# Patient Record
Sex: MALE | Race: WHITE | NOT HISPANIC OR LATINO | URBAN - METROPOLITAN AREA
[De-identification: names, ages, dates, MRNs, and addresses within clinical notes are randomized per-mention and may not be internally consistent; named-entity substitution may affect disease eponyms.]

---

## 2018-01-04 ENCOUNTER — APPOINTMENT (OUTPATIENT)
Dept: URBAN - METROPOLITAN AREA CLINIC 219 | Age: 58
Setting detail: DERMATOLOGY
End: 2018-01-04

## 2018-01-04 DIAGNOSIS — Q82.5 CONGENITAL NON-NEOPLASTIC NEVUS: ICD-10-CM

## 2018-01-04 DIAGNOSIS — L57.0 ACTINIC KERATOSIS: ICD-10-CM

## 2018-01-04 DIAGNOSIS — L663 OTHER SPECIFIED DISEASES OF HAIR AND HAIR FOLLICLES: ICD-10-CM

## 2018-01-04 DIAGNOSIS — L29.8 OTHER PRURITUS: ICD-10-CM

## 2018-01-04 DIAGNOSIS — L738 OTHER SPECIFIED DISEASES OF HAIR AND HAIR FOLLICLES: ICD-10-CM

## 2018-01-04 PROBLEM — L02.92 FURUNCLE, UNSPECIFIED: Status: ACTIVE | Noted: 2018-01-04

## 2018-01-04 PROCEDURE — OTHER PRESCRIPTION: OTHER

## 2018-01-04 PROCEDURE — 99203 OFFICE O/P NEW LOW 30 MIN: CPT | Mod: 25

## 2018-01-04 PROCEDURE — OTHER REASSURANCE: OTHER

## 2018-01-04 PROCEDURE — OTHER LIQUID NITROGEN: OTHER

## 2018-01-04 PROCEDURE — OTHER MIPS QUALITY: OTHER

## 2018-01-04 PROCEDURE — 17000 DESTRUCT PREMALG LESION: CPT

## 2018-01-04 PROCEDURE — OTHER TREATMENT REGIMEN: OTHER

## 2018-01-04 PROCEDURE — OTHER COUNSELING: OTHER

## 2018-01-04 RX ORDER — CLINDAMYCIN PHOSPHATE 10 MG/ML
APPLY SOLUTION TOPICAL
Qty: 1 | Refills: 5 | Status: ERX | COMMUNITY
Start: 2018-01-04

## 2018-01-04 RX ORDER — KETOCONAZOLE 20 MG/ML
APPLY SHAMPOO TOPICAL
Qty: 1 | Refills: 3 | Status: ERX | COMMUNITY
Start: 2018-01-04

## 2018-01-04 ASSESSMENT — LOCATION DETAILED DESCRIPTION DERM
LOCATION DETAILED: POSTERIOR MID-PARIETAL SCALP
LOCATION DETAILED: RIGHT SUPERIOR FOREHEAD
LOCATION DETAILED: MID-OCCIPITAL SCALP

## 2018-01-04 ASSESSMENT — LOCATION SIMPLE DESCRIPTION DERM
LOCATION SIMPLE: POSTERIOR SCALP
LOCATION SIMPLE: RIGHT FOREHEAD

## 2018-01-04 ASSESSMENT — LOCATION ZONE DERM
LOCATION ZONE: SCALP
LOCATION ZONE: FACE

## 2018-01-04 NOTE — PROCEDURE: TREATMENT REGIMEN
Plan: Declines antihistamines at this time
Initiate Treatment: Ketoconazole shampoo 2% twice a week alternate with tea tree shampoo \\nClindamycin solution twice a day as needed for pustules
Detail Level: Simple
Detail Level: Zone

## 2018-01-04 NOTE — HPI: SKIN IRRITATION
Additional History: Patient states has been itching after his first neck surgery two years ago.  Patient states he has had some pimples on his scalp.  Patient has used OTC head and shoulders and tea tree shampoo.  Patient sTates the tea tree helps the itching for a day but not much after that.

## 2018-01-04 NOTE — PROCEDURE: MIPS QUALITY
Quality 110: Preventive Care And Screening: Influenza Immunization: Influenza Immunization Administered during Influenza season
Detail Level: Detailed
Quality 226: Preventive Care And Screening: Tobacco Use: Screening And Cessation Intervention: Patient screened for tobacco and is a smoker AND received Cessation Counseling

## 2018-01-04 NOTE — HPI: SKIN LESION
Is This A New Presentation, Or A Follow-Up?: Skin Lesion
Additional History: Patient states it started as a raw area and is now scaly.

## 2018-04-03 ENCOUNTER — APPOINTMENT (OUTPATIENT)
Dept: URBAN - METROPOLITAN AREA CLINIC 219 | Age: 58
Setting detail: DERMATOLOGY
End: 2018-04-03

## 2018-04-03 DIAGNOSIS — L90.5 SCAR CONDITIONS AND FIBROSIS OF SKIN: ICD-10-CM

## 2018-04-03 DIAGNOSIS — L57.0 ACTINIC KERATOSIS: ICD-10-CM

## 2018-04-03 DIAGNOSIS — L29.8 OTHER PRURITUS: ICD-10-CM

## 2018-04-03 PROBLEM — N40.0 BENIGN PROSTATIC HYPERPLASIA WITHOUT LOWER URINARY TRACT SYMPTOMS: Status: ACTIVE | Noted: 2018-04-03

## 2018-04-03 PROBLEM — M12.9 ARTHROPATHY, UNSPECIFIED: Status: ACTIVE | Noted: 2018-04-03

## 2018-04-03 PROCEDURE — OTHER REASSURANCE: OTHER

## 2018-04-03 PROCEDURE — OTHER MIPS QUALITY: OTHER

## 2018-04-03 PROCEDURE — OTHER TREATMENT REGIMEN: OTHER

## 2018-04-03 PROCEDURE — 99213 OFFICE O/P EST LOW 20 MIN: CPT

## 2018-04-03 ASSESSMENT — LOCATION SIMPLE DESCRIPTION DERM
LOCATION SIMPLE: ANTERIOR SCALP
LOCATION SIMPLE: RIGHT FOREHEAD

## 2018-04-03 ASSESSMENT — LOCATION ZONE DERM
LOCATION ZONE: SCALP
LOCATION ZONE: FACE

## 2018-04-03 ASSESSMENT — LOCATION DETAILED DESCRIPTION DERM
LOCATION DETAILED: MID-FRONTAL SCALP
LOCATION DETAILED: RIGHT SUPERIOR FOREHEAD

## 2018-04-03 NOTE — PROCEDURE: TREATMENT REGIMEN
Detail Level: Simple
Modify Regimen: Add lidocaine cream 4% three times a day as needed for itching \\nAdd Betamethasone diproprionate lotion twice a day as needed for severe flares (given previously by Dr. Palacio and patient reports some improvement with medication)
Plan: Patient not candidate for Didier-pentin (previous intolerance) or Lyrica (previously ineffective)\\nRecommend patient discuss condition with Neurologist (had appointment for evaluation of neck issues)\\nConsider Doxepin cream if Lidocaine ineffective
Continue Regimen: Tea tree shampoo
Plan: LN2 applied, no charge

## 2020-03-30 ENCOUNTER — APPOINTMENT (OUTPATIENT)
Dept: URBAN - NONMETROPOLITAN AREA CLINIC 45 | Age: 60
Setting detail: DERMATOLOGY
End: 2020-03-30

## 2020-03-30 DIAGNOSIS — L57.0 ACTINIC KERATOSIS: ICD-10-CM

## 2020-03-30 DIAGNOSIS — L57.8 OTHER SKIN CHANGES DUE TO CHRONIC EXPOSURE TO NONIONIZING RADIATION: ICD-10-CM

## 2020-03-30 DIAGNOSIS — L82.1 OTHER SEBORRHEIC KERATOSIS: ICD-10-CM

## 2020-03-30 DIAGNOSIS — L663 OTHER SPECIFIED DISEASES OF HAIR AND HAIR FOLLICLES: ICD-10-CM

## 2020-03-30 DIAGNOSIS — L738 OTHER SPECIFIED DISEASES OF HAIR AND HAIR FOLLICLES: ICD-10-CM

## 2020-03-30 PROBLEM — L02.821 FURUNCLE OF HEAD [ANY PART, EXCEPT FACE]: Status: ACTIVE | Noted: 2020-03-30

## 2020-03-30 PROCEDURE — OTHER TREATMENT REGIMEN: OTHER

## 2020-03-30 PROCEDURE — OTHER PRESCRIPTION: OTHER

## 2020-03-30 PROCEDURE — 17000 DESTRUCT PREMALG LESION: CPT

## 2020-03-30 PROCEDURE — 17003 DESTRUCT PREMALG LES 2-14: CPT

## 2020-03-30 PROCEDURE — 99213 OFFICE O/P EST LOW 20 MIN: CPT | Mod: 25

## 2020-03-30 PROCEDURE — OTHER LIQUID NITROGEN: OTHER

## 2020-03-30 PROCEDURE — OTHER MIPS QUALITY: OTHER

## 2020-03-30 PROCEDURE — OTHER COUNSELING: OTHER

## 2020-03-30 RX ORDER — CLINDAMYCIN PHOSPHATE 10 MG/ML
APPLY SOLUTION TOPICAL
Qty: 1 | Refills: 3 | Status: ERX

## 2020-03-30 RX ORDER — KETOCONAZOLE 20 MG/ML
APPLY SHAMPOO TOPICAL PRN
Qty: 1 | Refills: 2 | Status: ERX | COMMUNITY
Start: 2020-03-30

## 2020-03-30 ASSESSMENT — LOCATION DETAILED DESCRIPTION DERM
LOCATION DETAILED: RIGHT SUPERIOR FOREHEAD
LOCATION DETAILED: LEFT CENTRAL MALAR CHEEK
LOCATION DETAILED: RIGHT CENTRAL MALAR CHEEK
LOCATION DETAILED: POSTERIOR MID-PARIETAL SCALP
LOCATION DETAILED: RIGHT INFERIOR CENTRAL MALAR CHEEK
LOCATION DETAILED: LEFT INFERIOR CENTRAL MALAR CHEEK
LOCATION DETAILED: LEFT SUPERIOR FRONTAL SCALP
LOCATION DETAILED: RIGHT MEDIAL ZYGOMA
LOCATION DETAILED: LEFT FOREHEAD
LOCATION DETAILED: RIGHT LATERAL FOREHEAD

## 2020-03-30 ASSESSMENT — LOCATION SIMPLE DESCRIPTION DERM
LOCATION SIMPLE: LEFT CHEEK
LOCATION SIMPLE: POSTERIOR SCALP
LOCATION SIMPLE: LEFT FOREHEAD
LOCATION SIMPLE: SCALP
LOCATION SIMPLE: RIGHT ZYGOMA
LOCATION SIMPLE: RIGHT FOREHEAD
LOCATION SIMPLE: RIGHT CHEEK

## 2020-03-30 ASSESSMENT — LOCATION ZONE DERM
LOCATION ZONE: FACE
LOCATION ZONE: SCALP

## 2020-03-30 NOTE — PROCEDURE: TREATMENT REGIMEN
Detail Level: Zone
Plan: Sunscreen everyday \\nConsider Efudex in fall / winter ( patient would have to hold the MTX )
Plan: Ketoconazole 2% Shampoo rotate with Tea Tree Shampoo \\nClindamycin solution twice a day as needed for pustules

## 2023-10-02 ENCOUNTER — OFFICE VISIT (OUTPATIENT)
Dept: URBAN - NONMETROPOLITAN AREA CLINIC 2 | Facility: CLINIC | Age: 63
End: 2023-10-02

## 2023-10-05 ENCOUNTER — LAB OUTSIDE AN ENCOUNTER (OUTPATIENT)
Dept: URBAN - NONMETROPOLITAN AREA CLINIC 2 | Facility: CLINIC | Age: 63
End: 2023-10-05

## 2023-10-05 ENCOUNTER — OFFICE VISIT (OUTPATIENT)
Dept: URBAN - NONMETROPOLITAN AREA CLINIC 2 | Facility: CLINIC | Age: 63
End: 2023-10-05
Payer: MEDICARE

## 2023-10-05 VITALS
HEART RATE: 101 BPM | DIASTOLIC BLOOD PRESSURE: 106 MMHG | SYSTOLIC BLOOD PRESSURE: 167 MMHG | HEIGHT: 70 IN | WEIGHT: 163 LBS | BODY MASS INDEX: 23.34 KG/M2

## 2023-10-05 DIAGNOSIS — K59.03 DRUG-INDUCED CONSTIPATION: ICD-10-CM

## 2023-10-05 DIAGNOSIS — K64.9 HEMORRHOIDS, UNSPECIFIED HEMORRHOID TYPE: ICD-10-CM

## 2023-10-05 DIAGNOSIS — R14.0 ABDOMINAL BLOATING: ICD-10-CM

## 2023-10-05 DIAGNOSIS — K58.1 IRRITABLE BOWEL SYNDROME WITH PREDOMINANT CONSTIPATION: ICD-10-CM

## 2023-10-05 DIAGNOSIS — R10.84 GENERALIZED ABDOMINAL PAIN: ICD-10-CM

## 2023-10-05 PROBLEM — 70153002: Status: ACTIVE | Noted: 2023-10-05

## 2023-10-05 PROBLEM — 440630006: Status: ACTIVE | Noted: 2023-10-05

## 2023-10-05 PROCEDURE — 99204 OFFICE O/P NEW MOD 45 MIN: CPT

## 2023-10-05 RX ORDER — PANTOPRAZOLE 40 MG/1
TABLET, DELAYED RELEASE ORAL
Qty: 180 TABLET | Status: ACTIVE | COMMUNITY

## 2023-10-05 RX ORDER — SYRINGE AND NEEDLE,INSULIN,1ML 28GX1/2"
SYRINGE, EMPTY DISPOSABLE MISCELLANEOUS
Qty: 3 EACH | Status: ACTIVE | COMMUNITY

## 2023-10-05 RX ORDER — OXYCODONE HYDROCHLORIDE 15 MG/1
TABLET ORAL
Qty: 120 TABLET | Status: ACTIVE | COMMUNITY

## 2023-10-05 RX ORDER — IRBESARTAN 150 MG/1
TABLET ORAL
Qty: 90 TABLET | Status: ACTIVE | COMMUNITY

## 2023-10-05 RX ORDER — ONDANSETRON 4 MG/1
TABLET, FILM COATED ORAL
Qty: 20 TABLET | Status: ACTIVE | COMMUNITY

## 2023-10-05 RX ORDER — SERTRALINE HYDROCHLORIDE 50 MG/1
TABLET, FILM COATED ORAL
Qty: 90 TABLET | Status: ACTIVE | COMMUNITY

## 2023-10-05 RX ORDER — TIZANIDINE 4 MG/1
TABLET ORAL
Qty: 90 TABLET | Status: ACTIVE | COMMUNITY

## 2023-10-05 RX ORDER — RIFAXIMIN 550 MG/1
1 TABLET TABLET ORAL THREE TIMES A DAY
Qty: 42 TABLET | Refills: 3 | OUTPATIENT
Start: 2023-10-05 | End: 2023-11-29

## 2023-10-05 NOTE — HPI-TODAY'S VISIT:
10/5/2023 Mr. Mckee presents to discuss his digestive issues.  He previously was on movantik given his history of using pain medicine He has changed his pain medication and noting he needed to increase his movantik. He feels incomplete defecation and stomach pain with gas.  States recent  colonosocpy a few months ago, with Dr. Mccoy Notes he had inflamation on CT with contrast 11/2022 Feels his stomach boiling gurgling Feels gas trapping type pain moving across his upper abd States history of a number of ulcers on upper scope in past.  SP

## 2023-10-14 LAB
A/G RATIO: 1.6
ABSOLUTE BASOPHILS: 82
ABSOLUTE EOSINOPHILS: 82
ABSOLUTE LYMPHOCYTES: 1251
ABSOLUTE MONOCYTES: 490
ABSOLUTE NEUTROPHILS: 4896
ALBUMIN: 3.9
ALKALINE PHOSPHATASE: 73
ALT (SGPT): 7
ANCA SCREEN: (no result)
AST (SGOT): 12
ATYPICAL P ANCA TITER: (no result)
BASOPHILS: 1.2
BILIRUBIN, TOTAL: 0.5
BUN/CREATININE RATIO: 9
BUN: 14
C-REACTIVE PROTEIN, QUANT: 3.5
CALCIUM: 9.2
CARBON DIOXIDE, TOTAL: 29
CHLORIDE: 100
CREATININE: 1.54
EGFR: 50
EOSINOPHILS: 1.2
GASCA: 10.1
GLOBULIN, TOTAL: 2.4
GLUCOSE: 84
HEMATOCRIT: 38.6
HEMOGLOBIN: 12.5
IRON BIND.CAP.(TIBC): 397
IRON SATURATION: 26
IRON: 102
LYMPHOCYTES: 18.4
MCH: 26.2
MCHC: 32.4
MCV: 80.9
MONOCYTES: 7.2
MPV: 8.5
MYELOPEROXIDASE ANTIBODY: <1
NEUTROPHILS: 72
PLATELET COUNT: 361
POTASSIUM: 4.8
PROTEIN, TOTAL: 6.3
PROTEINASE-3 ANTIBODY: <1
RDW: 17.1
RED BLOOD CELL COUNT: 4.77
SACCHAROMYCES CEREVISIAE AB (ASCA) (IGA): 8
SED RATE BY MODIFIED: 17
SODIUM: 138
WHITE BLOOD CELL COUNT: 6.8

## 2023-10-26 ENCOUNTER — TELEPHONE ENCOUNTER (OUTPATIENT)
Dept: URBAN - NONMETROPOLITAN AREA CLINIC 2 | Facility: CLINIC | Age: 63
End: 2023-10-26

## 2023-11-29 ENCOUNTER — OFFICE VISIT (OUTPATIENT)
Dept: URBAN - NONMETROPOLITAN AREA CLINIC 2 | Facility: CLINIC | Age: 63
End: 2023-11-29

## 2023-11-29 RX ORDER — ONDANSETRON 4 MG/1
TABLET, FILM COATED ORAL
Qty: 20 TABLET | Status: ACTIVE | COMMUNITY

## 2023-11-29 RX ORDER — RIFAXIMIN 550 MG/1
1 TABLET TABLET ORAL THREE TIMES A DAY
Qty: 42 TABLET | Refills: 3 | Status: ACTIVE | COMMUNITY
Start: 2023-10-05 | End: 2023-11-29

## 2023-11-29 RX ORDER — OXYCODONE HYDROCHLORIDE 15 MG/1
TABLET ORAL
Qty: 120 TABLET | Status: ACTIVE | COMMUNITY

## 2023-11-29 RX ORDER — PANTOPRAZOLE 40 MG/1
TABLET, DELAYED RELEASE ORAL
Qty: 180 TABLET | Status: ACTIVE | COMMUNITY

## 2023-11-29 RX ORDER — TIZANIDINE 4 MG/1
TABLET ORAL
Qty: 90 TABLET | Status: ACTIVE | COMMUNITY

## 2023-11-29 RX ORDER — IRBESARTAN 150 MG/1
TABLET ORAL
Qty: 90 TABLET | Status: ACTIVE | COMMUNITY

## 2023-11-29 RX ORDER — SYRINGE AND NEEDLE,INSULIN,1ML 28GX1/2"
SYRINGE, EMPTY DISPOSABLE MISCELLANEOUS
Qty: 3 EACH | Status: ACTIVE | COMMUNITY

## 2023-11-29 RX ORDER — SERTRALINE HYDROCHLORIDE 50 MG/1
TABLET, FILM COATED ORAL
Qty: 90 TABLET | Status: ACTIVE | COMMUNITY

## 2023-12-05 ENCOUNTER — OFFICE VISIT (OUTPATIENT)
Dept: URBAN - NONMETROPOLITAN AREA CLINIC 2 | Facility: CLINIC | Age: 63
End: 2023-12-05
Payer: MEDICARE

## 2023-12-05 ENCOUNTER — LAB OUTSIDE AN ENCOUNTER (OUTPATIENT)
Dept: URBAN - NONMETROPOLITAN AREA CLINIC 2 | Facility: CLINIC | Age: 63
End: 2023-12-05

## 2023-12-05 VITALS
BODY MASS INDEX: 24.48 KG/M2 | WEIGHT: 171 LBS | SYSTOLIC BLOOD PRESSURE: 155 MMHG | HEIGHT: 70 IN | HEART RATE: 106 BPM | DIASTOLIC BLOOD PRESSURE: 100 MMHG

## 2023-12-05 DIAGNOSIS — R10.84 GENERALIZED ABDOMINAL PAIN: ICD-10-CM

## 2023-12-05 DIAGNOSIS — K58.1 IRRITABLE BOWEL SYNDROME WITH PREDOMINANT CONSTIPATION: ICD-10-CM

## 2023-12-05 DIAGNOSIS — K59.03 DRUG-INDUCED CONSTIPATION: ICD-10-CM

## 2023-12-05 DIAGNOSIS — K64.9 HEMORRHOIDS, UNSPECIFIED HEMORRHOID TYPE: ICD-10-CM

## 2023-12-05 DIAGNOSIS — R14.0 ABDOMINAL BLOATING: ICD-10-CM

## 2023-12-05 PROCEDURE — 99214 OFFICE O/P EST MOD 30 MIN: CPT

## 2023-12-05 RX ORDER — SERTRALINE HYDROCHLORIDE 50 MG/1
TABLET, FILM COATED ORAL
Qty: 90 TABLET | Status: ACTIVE | COMMUNITY

## 2023-12-05 RX ORDER — PANTOPRAZOLE 40 MG/1
TABLET, DELAYED RELEASE ORAL
Qty: 180 TABLET | Status: ACTIVE | COMMUNITY

## 2023-12-05 RX ORDER — TIZANIDINE 4 MG/1
TABLET ORAL
Qty: 90 TABLET | Status: ACTIVE | COMMUNITY

## 2023-12-05 RX ORDER — SYRINGE AND NEEDLE,INSULIN,1ML 28GX1/2"
SYRINGE, EMPTY DISPOSABLE MISCELLANEOUS
Qty: 3 EACH | Status: ACTIVE | COMMUNITY

## 2023-12-05 RX ORDER — IRBESARTAN 150 MG/1
TABLET ORAL
Qty: 90 TABLET | Status: ACTIVE | COMMUNITY

## 2023-12-05 RX ORDER — ONDANSETRON 4 MG/1
TABLET, FILM COATED ORAL
Qty: 20 TABLET | Status: ACTIVE | COMMUNITY

## 2023-12-05 RX ORDER — OXYCODONE HYDROCHLORIDE 15 MG/1
TABLET ORAL
Qty: 120 TABLET | Status: ACTIVE | COMMUNITY

## 2023-12-05 RX ORDER — HYOSCYAMINE SULFATE 0.12 MG/1
1 TABLET UNDER THE TONGUE AND ALLOW TO DISSOLVE AS NEEDED TABLET, ORALLY DISINTEGRATING ORAL THREE TIMES A DAY
Qty: 30 | Refills: 0 | OUTPATIENT
Start: 2023-12-05 | End: 2023-12-15

## 2023-12-05 NOTE — HPI-TODAY'S VISIT:
10/5/2023 Mr. Mckee presents to discuss his digestive issues.  He previously was on movantik given his history of using pain medicine He has changed his pain medication and noting he needed to increase his movantik. He feels incomplete defecation and stomach pain with gas.  States recent  colonosocpy a few months ago, with Dr. Mccoy Notes he had inflamation on CT with contrast 11/2022 Feels his stomach boiling gurgling Feels gas trapping type pain moving across his upper abd States history of a number of ulcers on upper scope in past.  SP  12/5/2023 Mr. Mckee returns for follow up of abdominal pain. He continues on Movantik, using every other day as it makes him feel "off".  Denies hard stool. Feels like he has dysfunctional defecation and his anus does not relax when he pushes to void. Stool is usually mushy, sometimes watery loose. He eats one meal a day, usually bread/sandwich, cake, chocolate, sweets. Mostly drinks water, some sodas. Takes miralax daily.  He took 2 weeks of Xifaxan for IBS gas trapping and did not feel any improvement. Pain across upper abdomen with gurgling noise sounding like gas. Labs for IBD panel did result positive.  He denies blood in stool.  He and wife agree today to repeat colonoscopy with biopsies to evaluate for IBD, will complete [facility] labs today. Plan for two days of clears for good prep and will trial Levsin SL for possible spasm pain, reviewed Gas in Digestive tract recommendations. SP

## 2023-12-05 NOTE — PHYSICAL EXAM NECK/THYROID:
[Healthy eating counseling provided] : healthy eating normal appearance , no deformities , trachea midline [Activity counseling provided] : activity [Good understanding] : Patient has a good understanding of disease, goals and obesity follow-up plan [Behavioral health counseling provided] : behavioral health  [___ min/wk activity recommended] : [unfilled] min/wk activity recommended [Walking] : Walking [None] : None [Engage in a relaxing activity] : Engage in a relaxing activity [ - Annual Depression Screening] : Annual Depression Screening

## 2023-12-05 NOTE — PHYSICAL EXAM NEUROLOGIC:
oriented to person, place and time ,  , cranial nerves 2-12 grossly intact Detail Level: Generalized General Sunscreen Counseling: I recommended a broad spectrum sunscreen with a SPF of 30 or higher.  I explained that SPF 30 sunscreens block approximately 97 percent of the sun's harmful rays.  Sunscreens should be applied at least 15 minutes prior to expected sun exposure and then every 2 hours after that as long as sun exposure continues. If swimming or exercising sunscreen should be reapplied every 45 minutes to an hour after getting wet or sweating.  One ounce, or the equivalent of a shot glass full of sunscreen, is adequate to protect the skin not covered by a bathing suit. I also recommended a lip balm with a sunscreen as well. Sun protective clothing can be used in lieu of sunscreen but must be worn the entire time you are exposed to the sun's rays.

## 2023-12-11 PROBLEM — 116289008: Status: ACTIVE | Noted: 2023-10-05

## 2023-12-22 ENCOUNTER — TELEPHONE ENCOUNTER (OUTPATIENT)
Dept: URBAN - NONMETROPOLITAN AREA CLINIC 2 | Facility: CLINIC | Age: 63
End: 2023-12-22

## 2023-12-22 RX ORDER — HYOSCYAMINE SULFATE 0.12 MG/1
1 TABLET UNDER THE TONGUE AND ALLOW TO DISSOLVE AS NEEDED TABLET, ORALLY DISINTEGRATING ORAL THREE TIMES A DAY
Qty: 30 | Refills: 1
Start: 2023-12-05 | End: 2024-01-11

## 2024-01-16 ENCOUNTER — TELEPHONE ENCOUNTER (OUTPATIENT)
Dept: URBAN - NONMETROPOLITAN AREA CLINIC 2 | Facility: CLINIC | Age: 64
End: 2024-01-16

## 2024-01-16 RX ORDER — LACTULOSE 10 G/15ML
45 ML AS NEEDED SOLUTION ORAL THREE TIMES A DAY
Qty: 4050 ML | Refills: 3 | OUTPATIENT
Start: 2024-01-16 | End: 2024-05-15

## 2024-01-17 ENCOUNTER — OFFICE VISIT (OUTPATIENT)
Dept: URBAN - NONMETROPOLITAN AREA CLINIC 2 | Facility: CLINIC | Age: 64
End: 2024-01-17
Payer: MEDICARE

## 2024-01-17 ENCOUNTER — LAB OUTSIDE AN ENCOUNTER (OUTPATIENT)
Dept: URBAN - NONMETROPOLITAN AREA CLINIC 2 | Facility: CLINIC | Age: 64
End: 2024-01-17

## 2024-01-17 VITALS
DIASTOLIC BLOOD PRESSURE: 124 MMHG | HEIGHT: 70 IN | HEART RATE: 92 BPM | WEIGHT: 173 LBS | SYSTOLIC BLOOD PRESSURE: 174 MMHG | BODY MASS INDEX: 24.77 KG/M2

## 2024-01-17 DIAGNOSIS — R14.0 ABDOMINAL BLOATING: ICD-10-CM

## 2024-01-17 DIAGNOSIS — K58.1 IRRITABLE BOWEL SYNDROME WITH PREDOMINANT CONSTIPATION: ICD-10-CM

## 2024-01-17 DIAGNOSIS — K59.03 DRUG-INDUCED CONSTIPATION: ICD-10-CM

## 2024-01-17 DIAGNOSIS — K64.9 HEMORRHOIDS, UNSPECIFIED HEMORRHOID TYPE: ICD-10-CM

## 2024-01-17 DIAGNOSIS — R10.84 GENERALIZED ABDOMINAL PAIN: ICD-10-CM

## 2024-01-17 PROCEDURE — 99213 OFFICE O/P EST LOW 20 MIN: CPT

## 2024-01-17 RX ORDER — SYRINGE AND NEEDLE,INSULIN,1ML 28GX1/2"
SYRINGE, EMPTY DISPOSABLE MISCELLANEOUS
Qty: 3 EACH | Status: ACTIVE | COMMUNITY

## 2024-01-17 RX ORDER — IRBESARTAN 150 MG/1
TABLET ORAL
Qty: 90 TABLET | Status: ACTIVE | COMMUNITY

## 2024-01-17 RX ORDER — PANTOPRAZOLE 40 MG/1
TABLET, DELAYED RELEASE ORAL
Qty: 180 TABLET | Status: ACTIVE | COMMUNITY

## 2024-01-17 RX ORDER — SERTRALINE HYDROCHLORIDE 50 MG/1
TABLET, FILM COATED ORAL
Qty: 90 TABLET | Status: ACTIVE | COMMUNITY

## 2024-01-17 RX ORDER — ONDANSETRON 4 MG/1
TABLET, FILM COATED ORAL
Qty: 20 TABLET | Status: ACTIVE | COMMUNITY

## 2024-01-17 RX ORDER — LACTULOSE 10 G/15ML
45 ML AS NEEDED SOLUTION ORAL THREE TIMES A DAY
Qty: 4050 ML | Refills: 3 | Status: ACTIVE | COMMUNITY
Start: 2024-01-16 | End: 2024-05-15

## 2024-01-17 RX ORDER — TIZANIDINE 4 MG/1
TABLET ORAL
Qty: 90 TABLET | Status: ACTIVE | COMMUNITY

## 2024-01-17 RX ORDER — OXYCODONE HYDROCHLORIDE 15 MG/1
TABLET ORAL
Qty: 120 TABLET | Status: ACTIVE | COMMUNITY

## 2024-01-17 NOTE — HPI-TODAY'S VISIT:
10/5/2023 Mr. Mckee presents to discuss his digestive issues.  He previously was on movantik given his history of using pain medicine He has changed his pain medication and noting he needed to increase his movantik. He feels incomplete defecation and stomach pain with gas.  States recent  colonosocpy a few months ago, with Dr. Mccoy Notes he had inflamation on CT with contrast 11/2022 Feels his stomach boiling gurgling Feels gas trapping type pain moving across his upper abd States history of a number of ulcers on upper scope in past.  SP  12/5/2023 Mr. Mckee returns for follow up of abdominal pain. He continues on Movantik, using every other day as it makes him feel "off".  Denies hard stool. Feels like he has dysfunctional defecation and his anus does not relax when he pushes to void. Stool is usually mushy, sometimes watery loose. He eats one meal a day, usually bread/sandwich, cake, chocolate, sweets. Mostly drinks water, some sodas. Takes miralax daily.  He took 2 weeks of Xifaxan for IBS gas trapping and did not feel any improvement. Pain across upper abdomen with gurgling noise sounding like gas. Labs for IBD panel did result positive.  He denies blood in stool.  He and wife agree today to repeat colonoscopy with biopsies to evaluate for IBD, will complete [facility] labs today. Plan for two days of clears for good prep and will trial Levsin SL for possible spasm pain, reviewed Gas in Digestive tract recommendations. SP   1/17/2024 Mr. Mckee returns to clinic for episodic follow-up given an increase in his abdominal distention and constipation.  His wife reported "crystallized stool" to our staff and requesting this appointment.  Today she states it is scribed that way as she can see the stool and it is not coming out. Patient states pushing with force but there is no bowel movement.  He has been started on lactulose 45 3 times daily and started this yesterday.  He states he can feel some activity in his abdomen.  He states he has had this happen before and eventually does pass stool bolus with liquid stool following.  Today we discussed the possibility of fecal ball with his constipation treatment.  He has continued on his UA medicines for pain control but reducing the number of pills he has been taking daily. Denies any fevers, chills or blood in his stool. He is awaiting his planned colonoscopy with Balwinder Escobar did result consistent with ulcerative colitis. Today we discussed completing lab work and having him scheduled for outpatient CT scan, he will present to the emergency department if his condition worsens developed severe abdominal pain. SP

## 2024-01-21 ENCOUNTER — WEB ENCOUNTER (OUTPATIENT)
Dept: URBAN - NONMETROPOLITAN AREA CLINIC 2 | Facility: CLINIC | Age: 64
End: 2024-01-21

## 2024-01-26 ENCOUNTER — WEB ENCOUNTER (OUTPATIENT)
Dept: URBAN - NONMETROPOLITAN AREA CLINIC 2 | Facility: CLINIC | Age: 64
End: 2024-01-26

## 2024-01-28 ENCOUNTER — WEB ENCOUNTER (OUTPATIENT)
Dept: URBAN - NONMETROPOLITAN AREA CLINIC 2 | Facility: CLINIC | Age: 64
End: 2024-01-28

## 2024-01-29 ENCOUNTER — WEB ENCOUNTER (OUTPATIENT)
Dept: URBAN - NONMETROPOLITAN AREA CLINIC 2 | Facility: CLINIC | Age: 64
End: 2024-01-29

## 2024-01-30 ENCOUNTER — TELEPHONE ENCOUNTER (OUTPATIENT)
Dept: URBAN - NONMETROPOLITAN AREA CLINIC 2 | Facility: CLINIC | Age: 64
End: 2024-01-30

## 2024-01-30 PROBLEM — 15634181000119107: Status: ACTIVE | Noted: 2024-01-30

## 2024-01-31 PROBLEM — 102614006: Status: ACTIVE | Noted: 2023-10-05

## 2024-01-31 PROBLEM — 21782001: Status: ACTIVE | Noted: 2023-10-05

## 2024-02-06 ENCOUNTER — OV EP (OUTPATIENT)
Dept: URBAN - NONMETROPOLITAN AREA CLINIC 2 | Facility: CLINIC | Age: 64
End: 2024-02-06

## 2024-02-15 ENCOUNTER — OV EP (OUTPATIENT)
Dept: URBAN - NONMETROPOLITAN AREA CLINIC 2 | Facility: CLINIC | Age: 64
End: 2024-02-15

## 2024-03-04 ENCOUNTER — OV EP (OUTPATIENT)
Dept: URBAN - NONMETROPOLITAN AREA CLINIC 2 | Facility: CLINIC | Age: 64
End: 2024-03-04
Payer: MEDICARE

## 2024-03-04 VITALS
DIASTOLIC BLOOD PRESSURE: 125 MMHG | WEIGHT: 169.8 LBS | BODY MASS INDEX: 24.31 KG/M2 | SYSTOLIC BLOOD PRESSURE: 174 MMHG | HEIGHT: 70 IN | HEART RATE: 108 BPM | TEMPERATURE: 98 F

## 2024-03-04 DIAGNOSIS — K64.9 HEMORRHOIDS, UNSPECIFIED HEMORRHOID TYPE: ICD-10-CM

## 2024-03-04 DIAGNOSIS — R14.0 ABDOMINAL BLOATING: ICD-10-CM

## 2024-03-04 DIAGNOSIS — K59.03 DRUG-INDUCED CONSTIPATION: ICD-10-CM

## 2024-03-04 DIAGNOSIS — R93.5 ABNORMAL ABDOMINAL CT SCAN: ICD-10-CM

## 2024-03-04 DIAGNOSIS — K58.1 IRRITABLE BOWEL SYNDROME WITH PREDOMINANT CONSTIPATION: ICD-10-CM

## 2024-03-04 DIAGNOSIS — R10.84 GENERALIZED ABDOMINAL PAIN: ICD-10-CM

## 2024-03-04 PROCEDURE — 99213 OFFICE O/P EST LOW 20 MIN: CPT

## 2024-03-04 RX ORDER — OXYCODONE HYDROCHLORIDE 15 MG/1
TABLET ORAL
Qty: 120 TABLET | Status: ACTIVE | COMMUNITY

## 2024-03-04 RX ORDER — TIZANIDINE 4 MG/1
TABLET ORAL
Qty: 90 TABLET | Status: ACTIVE | COMMUNITY

## 2024-03-04 RX ORDER — IRBESARTAN 150 MG/1
TABLET ORAL
Qty: 90 TABLET | Status: ACTIVE | COMMUNITY

## 2024-03-04 RX ORDER — ONDANSETRON 4 MG/1
TABLET, FILM COATED ORAL
Qty: 20 TABLET | Status: ACTIVE | COMMUNITY

## 2024-03-04 RX ORDER — SYRINGE AND NEEDLE,INSULIN,1ML 28GX1/2"
SYRINGE, EMPTY DISPOSABLE MISCELLANEOUS
Qty: 3 EACH | Status: ACTIVE | COMMUNITY

## 2024-03-04 RX ORDER — PANTOPRAZOLE 40 MG/1
TABLET, DELAYED RELEASE ORAL
Qty: 180 TABLET | Status: ACTIVE | COMMUNITY

## 2024-03-04 RX ORDER — SERTRALINE HYDROCHLORIDE 50 MG/1
TABLET, FILM COATED ORAL
Qty: 90 TABLET | Status: ACTIVE | COMMUNITY

## 2024-03-04 RX ORDER — LACTULOSE 10 G/15ML
45 ML AS NEEDED SOLUTION ORAL THREE TIMES A DAY
Qty: 4050 ML | Refills: 3 | Status: ACTIVE | COMMUNITY
Start: 2024-01-16 | End: 2024-05-15

## 2024-03-04 NOTE — HPI-TODAY'S VISIT:
10/5/2023 Mr. Mckee presents to discuss his digestive issues.  He previously was on movantik given his history of using pain medicine He has changed his pain medication and noting he needed to increase his movantik. He feels incomplete defecation and stomach pain with gas.  States recent  colonosocpy a few months ago, with Dr. Mccoy Notes he had inflamation on CT with contrast 11/2022 Feels his stomach boiling gurgling Feels gas trapping type pain moving across his upper abd States history of a number of ulcers on upper scope in past.  SP  12/5/2023 Mr. Mckee returns for follow up of abdominal pain. He continues on Movantik, using every other day as it makes him feel "off".  Denies hard stool. Feels like he has dysfunctional defecation and his anus does not relax when he pushes to void. Stool is usually mushy, sometimes watery loose. He eats one meal a day, usually bread/sandwich, cake, chocolate, sweets. Mostly drinks water, some sodas. Takes miralax daily.  He took 2 weeks of Xifaxan for IBS gas trapping and did not feel any improvement. Pain across upper abdomen with gurgling noise sounding like gas. Labs for IBD panel did result positive.  He denies blood in stool.  He and wife agree today to repeat colonoscopy with biopsies to evaluate for IBD, will complete [facility] labs today. Plan for two days of clears for good prep and will trial Levsin SL for possible spasm pain, reviewed Gas in Digestive tract recommendations. SP   1/17/2024 Mr. Mckee returns to clinic for episodic follow-up given an increase in his abdominal distention and constipation.  His wife reported "crystallized stool" to our staff and requesting this appointment.  Today she states it is scribed that way as she can see the stool and it is not coming out. Patient states pushing with force but there is no bowel movement.  He has been started on lactulose 45 3 times daily and started this yesterday.  He states he can feel some activity in his abdomen.  He states he has had this happen before and eventually does pass stool bolus with liquid stool following.  Today we discussed the possibility of fecal ball with his constipation treatment.  He has continued on his UA medicines for pain control but reducing the number of pills he has been taking daily. Denies any fevers, chills or blood in his stool. He is awaiting his planned colonoscopy with Dr. Davis, Balwinder Lyon did result consistent with ulcerative colitis. Today we discussed completing lab work and having him scheduled for outpatient CT scan, he will present to the emergency department if his condition worsens developed severe abdominal pain. SP 3/4/2024 Sofia returns to clinic for follow-up with abdominal discomfort and constipation.  He states he has discontinued lactulose as he had bloating and gas symptoms associated. He is now resorted to MiraLAX twice a day which is producing very soft stool.  However he feels he has some incomplete emptying.  He feels that he is pushing against swollen hemorrhoid versus swollen area in his rectum.  We discussed trial of hemorrhoidal suppositories however he would like for his rectal area to be just as it is right now and Dr. Davis does his colonoscopy.  He would like to a very thorough evaluation and would like to consider hemorrhoid intervention if appropriate. He is weaned off of his pain medication and is feeling more discomfort from his arthritis but is managing. He is having regular bowel movements and overall his abdominal discomfort has improved other than some occasional cramping that precedes a very loose bowel movement. SP

## 2024-03-14 ENCOUNTER — COLON (OUTPATIENT)
Dept: URBAN - METROPOLITAN AREA MEDICAL CENTER 1 | Facility: MEDICAL CENTER | Age: 64
End: 2024-03-14
Payer: MEDICARE

## 2024-03-14 DIAGNOSIS — K59.09 CHANGE IN BOWEL MOVEMENTS INTERMITTENT CONSTIPATION. URGENCY IN THE MORNING.: ICD-10-CM

## 2024-03-14 PROCEDURE — 45380 COLONOSCOPY AND BIOPSY: CPT | Performed by: INTERNAL MEDICINE

## 2024-03-14 RX ORDER — OXYCODONE HYDROCHLORIDE 15 MG/1
TABLET ORAL
Qty: 120 TABLET | Status: ACTIVE | COMMUNITY

## 2024-03-14 RX ORDER — SERTRALINE HYDROCHLORIDE 50 MG/1
TABLET, FILM COATED ORAL
Qty: 90 TABLET | Status: ACTIVE | COMMUNITY

## 2024-03-14 RX ORDER — SYRINGE AND NEEDLE,INSULIN,1ML 28GX1/2"
SYRINGE, EMPTY DISPOSABLE MISCELLANEOUS
Qty: 3 EACH | Status: ACTIVE | COMMUNITY

## 2024-03-14 RX ORDER — TIZANIDINE 4 MG/1
TABLET ORAL
Qty: 90 TABLET | Status: ACTIVE | COMMUNITY

## 2024-03-14 RX ORDER — PANTOPRAZOLE 40 MG/1
TABLET, DELAYED RELEASE ORAL
Qty: 180 TABLET | Status: ACTIVE | COMMUNITY

## 2024-03-14 RX ORDER — LACTULOSE 10 G/15ML
45 ML AS NEEDED SOLUTION ORAL THREE TIMES A DAY
Qty: 4050 ML | Refills: 3 | Status: ACTIVE | COMMUNITY
Start: 2024-01-16 | End: 2024-05-15

## 2024-03-14 RX ORDER — ONDANSETRON 4 MG/1
TABLET, FILM COATED ORAL
Qty: 20 TABLET | Status: ACTIVE | COMMUNITY

## 2024-03-14 RX ORDER — IRBESARTAN 150 MG/1
TABLET ORAL
Qty: 90 TABLET | Status: ACTIVE | COMMUNITY

## 2024-04-12 ENCOUNTER — OV EP (OUTPATIENT)
Dept: URBAN - NONMETROPOLITAN AREA CLINIC 13 | Facility: CLINIC | Age: 64
End: 2024-04-12

## 2024-05-07 ENCOUNTER — OFFICE VISIT (OUTPATIENT)
Dept: URBAN - NONMETROPOLITAN AREA CLINIC 2 | Facility: CLINIC | Age: 64
End: 2024-05-07

## 2024-05-28 ENCOUNTER — LAB OUTSIDE AN ENCOUNTER (OUTPATIENT)
Dept: URBAN - NONMETROPOLITAN AREA CLINIC 2 | Facility: CLINIC | Age: 64
End: 2024-05-28

## 2024-06-04 ENCOUNTER — OFFICE VISIT (OUTPATIENT)
Dept: URBAN - NONMETROPOLITAN AREA CLINIC 2 | Facility: CLINIC | Age: 64
End: 2024-06-04
Payer: MEDICARE

## 2024-06-04 ENCOUNTER — DASHBOARD ENCOUNTERS (OUTPATIENT)
Age: 64
End: 2024-06-04

## 2024-06-04 VITALS
SYSTOLIC BLOOD PRESSURE: 120 MMHG | DIASTOLIC BLOOD PRESSURE: 74 MMHG | BODY MASS INDEX: 26.8 KG/M2 | HEIGHT: 70 IN | HEART RATE: 111 BPM | WEIGHT: 187.2 LBS | TEMPERATURE: 98 F

## 2024-06-04 DIAGNOSIS — R93.5 ABNORMAL ABDOMINAL CT SCAN: ICD-10-CM

## 2024-06-04 DIAGNOSIS — K58.1 IRRITABLE BOWEL SYNDROME WITH PREDOMINANT CONSTIPATION: ICD-10-CM

## 2024-06-04 DIAGNOSIS — R10.84 GENERALIZED ABDOMINAL PAIN: ICD-10-CM

## 2024-06-04 DIAGNOSIS — R14.0 ABDOMINAL BLOATING: ICD-10-CM

## 2024-06-04 DIAGNOSIS — K59.03 DRUG-INDUCED CONSTIPATION: ICD-10-CM

## 2024-06-04 DIAGNOSIS — K64.9 HEMORRHOIDS, UNSPECIFIED HEMORRHOID TYPE: ICD-10-CM

## 2024-06-04 PROCEDURE — 99214 OFFICE O/P EST MOD 30 MIN: CPT | Performed by: INTERNAL MEDICINE

## 2024-06-04 RX ORDER — IRBESARTAN 150 MG/1
TABLET ORAL
Qty: 90 TABLET | Status: ACTIVE | COMMUNITY

## 2024-06-04 RX ORDER — SERTRALINE HYDROCHLORIDE 50 MG/1
TABLET, FILM COATED ORAL
Qty: 90 TABLET | Status: ACTIVE | COMMUNITY

## 2024-06-04 RX ORDER — SYRINGE AND NEEDLE,INSULIN,1ML 28GX1/2"
SYRINGE, EMPTY DISPOSABLE MISCELLANEOUS
Qty: 3 EACH | Status: ACTIVE | COMMUNITY

## 2024-06-04 RX ORDER — TIZANIDINE 4 MG/1
TABLET ORAL
Qty: 90 TABLET | Status: ACTIVE | COMMUNITY

## 2024-06-04 RX ORDER — ONDANSETRON 4 MG/1
TABLET, FILM COATED ORAL
Qty: 20 TABLET | Status: ACTIVE | COMMUNITY

## 2024-06-04 RX ORDER — LINACLOTIDE 72 UG/1
1 CAPSULE AT LEAST 30 MINUTES BEFORE THE FIRST MEAL OF THE DAY ON AN EMPTY STOMACH CAPSULE, GELATIN COATED ORAL ONCE A DAY
Status: ACTIVE | COMMUNITY

## 2024-06-04 RX ORDER — PANTOPRAZOLE 40 MG/1
TABLET, DELAYED RELEASE ORAL
Qty: 180 TABLET | Status: ACTIVE | COMMUNITY

## 2024-06-04 NOTE — HPI-TODAY'S VISIT:
10/5/2023 Mr. Mckee presents to discuss his digestive issues.  He previously was on movantik given his history of using pain medicine He has changed his pain medication and noting he needed to increase his movantik. He feels incomplete defecation and stomach pain with gas.  States recent  colonosocpy a few months ago, with Dr. Mccoy Notes he had inflamation on CT with contrast 11/2022 Feels his stomach boiling gurgling Feels gas trapping type pain moving across his upper abd States history of a number of ulcers on upper scope in past.  SP  12/5/2023 Mr. Mckee returns for follow up of abdominal pain. He continues on Movantik, using every other day as it makes him feel "off".  Denies hard stool. Feels like he has dysfunctional defecation and his anus does not relax when he pushes to void. Stool is usually mushy, sometimes watery loose. He eats one meal a day, usually bread/sandwich, cake, chocolate, sweets. Mostly drinks water, some sodas. Takes miralax daily.  He took 2 weeks of Xifaxan for IBS gas trapping and did not feel any improvement. Pain across upper abdomen with gurgling noise sounding like gas. Labs for IBD panel did result positive.  He denies blood in stool.  He and wife agree today to repeat colonoscopy with biopsies to evaluate for IBD, will complete [facility] labs today. Plan for two days of clears for good prep and will trial Levsin SL for possible spasm pain, reviewed Gas in Digestive tract recommendations. SP   1/17/2024 Mr. Mckee returns to clinic for episodic follow-up given an increase in his abdominal distention and constipation.  His wife reported "crystallized stool" to our staff and requesting this appointment.  Today she states it is scribed that way as she can see the stool and it is not coming out. Patient states pushing with force but there is no bowel movement.  He has been started on lactulose 45 3 times daily and started this yesterday.  He states he can feel some activity in his abdomen.  He states he has had this happen before and eventually does pass stool bolus with liquid stool following.  Today we discussed the possibility of fecal ball with his constipation treatment.  He has continued on his UA medicines for pain control but reducing the number of pills he has been taking daily. Denies any fevers, chills or blood in his stool. He is awaiting his planned colonoscopy with Dr. Davis, Balwinder Lyon did result consistent with ulcerative colitis. Today we discussed completing lab work and having him scheduled for outpatient CT scan, he will present to the emergency department if his condition worsens developed severe abdominal pain. SP 3/4/2024 Sofia returns to clinic for follow-up with abdominal discomfort and constipation.  He states he has discontinued lactulose as he had bloating and gas symptoms associated. He is now resorted to MiraLAX twice a day which is producing very soft stool.  However he feels he has some incomplete emptying.  He feels that he is pushing against swollen hemorrhoid versus swollen area in his rectum.  We discussed trial of hemorrhoidal suppositories however he would like for his rectal area to be just as it is right now and Dr. Davis does his colonoscopy.  He would like to a very thorough evaluation and would like to consider hemorrhoid intervention if appropriate. He is weaned off of his pain medication and is feeling more discomfort from his arthritis but is managing. He is having regular bowel movements and overall his abdominal discomfort has improved other than some occasional cramping that precedes a very loose bowel movement. SP 6/4/24. The patient presents today for a follow up of his abdominal pain, constipation, and abnormal CT scan. He was originally seen in our office as a second opinion. He presented with worsening abdominal pain and constipation. He underwent laboratory testing along with a CT scan and a colonoscopy. His colonoscopy was normal. His CT scan did show , right lower quadrant lymphadenopathy, and repeat imaging was suggested. He also had some aneurysmal dilation. He has seen vascular surgery for this, and they are going to follow this. Today, he has been doing much better. He is currently on Linzess, and this has helped his abdominal pain and constipation significantly. He has not had his repeat CT scan because he was confused, as to why he needed this done. Today, we have discussed his abnormal lymph nodes, and he is willing to proceed with repeat CT scan. We have also discussed his smoking and pain medication use. Overall, from my standpoint, he is much better today today. We can we would recommend continuing the lenses and proceeding with CT for further evaluation. He will follow up with us in the office after the CT scan is done.

## 2024-12-03 ENCOUNTER — OFFICE VISIT (OUTPATIENT)
Dept: URBAN - NONMETROPOLITAN AREA CLINIC 2 | Facility: CLINIC | Age: 64
End: 2024-12-03

## 2025-02-03 ENCOUNTER — OFFICE VISIT (OUTPATIENT)
Dept: URBAN - NONMETROPOLITAN AREA CLINIC 2 | Facility: CLINIC | Age: 65
End: 2025-02-03

## 2025-03-18 ENCOUNTER — OFFICE VISIT (OUTPATIENT)
Dept: URBAN - NONMETROPOLITAN AREA CLINIC 2 | Facility: CLINIC | Age: 65
End: 2025-03-18

## 2025-04-29 ENCOUNTER — OFFICE VISIT (OUTPATIENT)
Dept: URBAN - NONMETROPOLITAN AREA CLINIC 2 | Facility: CLINIC | Age: 65
End: 2025-04-29

## 2025-07-01 ENCOUNTER — OFFICE VISIT (OUTPATIENT)
Dept: URBAN - NONMETROPOLITAN AREA CLINIC 2 | Facility: CLINIC | Age: 65
End: 2025-07-01

## 2025-07-07 ENCOUNTER — OFFICE VISIT (OUTPATIENT)
Dept: URBAN - NONMETROPOLITAN AREA CLINIC 2 | Facility: CLINIC | Age: 65
End: 2025-07-07